# Patient Record
Sex: MALE | URBAN - METROPOLITAN AREA
[De-identification: names, ages, dates, MRNs, and addresses within clinical notes are randomized per-mention and may not be internally consistent; named-entity substitution may affect disease eponyms.]

---

## 2023-01-03 ENCOUNTER — OFFICE VISIT (OUTPATIENT)
Dept: URBAN - METROPOLITAN AREA CLINIC 33 | Facility: CLINIC | Age: 28
End: 2023-01-03
Payer: MEDICAID

## 2023-01-03 DIAGNOSIS — H04.123 DRY EYE SYNDROME OF BILATERAL LACRIMAL GLANDS: Primary | ICD-10-CM

## 2023-01-03 PROCEDURE — 92004 COMPRE OPH EXAM NEW PT 1/>: CPT

## 2023-01-03 ASSESSMENT — INTRAOCULAR PRESSURE
OS: 17
OD: 17

## 2023-01-17 ENCOUNTER — OFFICE VISIT (OUTPATIENT)
Dept: URBAN - METROPOLITAN AREA CLINIC 33 | Facility: CLINIC | Age: 28
End: 2023-01-17
Payer: MEDICAID

## 2023-01-17 DIAGNOSIS — H52.03 HYPERMETROPIA, BILATERAL: Primary | ICD-10-CM

## 2023-01-17 ASSESSMENT — VISUAL ACUITY
OS: 20/40
OD: 20/40

## 2023-01-17 ASSESSMENT — KERATOMETRY
OD: 44.75
OS: 44.88

## 2023-01-17 NOTE — IMPRESSION/PLAN
Impression: Hypermetropia, bilateral: H52.03.
-decreased power slightly to make it easier for patient to adapt to
-trial framed lenses with patient able to tolerate lenses Plan: Patient educated on all refractive findings. SRx was finalized and released to patient. Discussed adaptation period of at least 3 weeks of full time wear with patient. Continue to monitor.